# Patient Record
Sex: MALE | Race: BLACK OR AFRICAN AMERICAN | NOT HISPANIC OR LATINO | Employment: UNEMPLOYED | URBAN - METROPOLITAN AREA
[De-identification: names, ages, dates, MRNs, and addresses within clinical notes are randomized per-mention and may not be internally consistent; named-entity substitution may affect disease eponyms.]

---

## 2022-09-26 ENCOUNTER — HOSPITAL ENCOUNTER (EMERGENCY)
Facility: HOSPITAL | Age: 32
Discharge: HOME | End: 2022-09-26
Attending: EMERGENCY MEDICINE

## 2022-09-26 PROCEDURE — 99211 OFF/OP EST MAY X REQ PHY/QHP: CPT | Performed by: EMERGENCY MEDICINE

## 2023-02-08 ENCOUNTER — HOSPITAL ENCOUNTER (EMERGENCY)
Facility: HOSPITAL | Age: 33
Discharge: HOME | End: 2023-02-08
Attending: EMERGENCY MEDICINE | Admitting: EMERGENCY MEDICINE
Payer: MEDICAID

## 2023-02-08 VITALS
SYSTOLIC BLOOD PRESSURE: 126 MMHG | TEMPERATURE: 97 F | DIASTOLIC BLOOD PRESSURE: 68 MMHG | RESPIRATION RATE: 16 BRPM | HEART RATE: 58 BPM | OXYGEN SATURATION: 92 %

## 2023-02-08 DIAGNOSIS — K04.7 DENTAL ABSCESS: Primary | ICD-10-CM

## 2023-02-08 PROCEDURE — 99282 EMERGENCY DEPT VISIT SF MDM: CPT

## 2023-02-08 RX ORDER — AMOXICILLIN 875 MG/1
875 TABLET, COATED ORAL 2 TIMES DAILY
Qty: 14 TABLET | Refills: 0 | Status: SHIPPED | OUTPATIENT
Start: 2023-02-08 | End: 2023-02-15

## 2023-02-08 NOTE — DISCHARGE INSTRUCTIONS
Take the antibiotics as prescribed.  Follow-up with the dentist for further evaluation.  Return here for any trouble breathing or swallowing.  Return here for any fevers.

## 2023-02-08 NOTE — ED PROVIDER NOTES
EMERGENCY DEPARTMENT ENCOUNTER    Room Number:  S01/01  Date of encounter:  2/8/2023  PCP: No primary care provider on file.  No care team member to display   Independent Historians: Patient    HPI:  Chief Complaint: Dental infection  A complete HPI/ROS/PMH/PSH/SH/FH are unobtainable due to: Nothing    Chronic or social conditions impacting patient care (social determinants of health): None    Context: Deny Pimentel is a 32 y.o. male who presents to the ED c/o having a dental infection.  He reports that he recently finished penicillin for a dental infection of his right lower jaw.  He states that he has not yet been able to see a dentist.  He states that he feels the infection is coming back.  He requests antibiotics.  He denies any trouble breathing or swallowing.  He denies any fevers.  He states he has had multiple dental problems in the past.  He states another emergency department prescribed him his penicillin.    Review of prior external notes (non-ED): No prior records in our system.    Review of prior external test results outside of this encounter: No prior results in our system.    PAST MEDICAL HISTORY  Active Ambulatory Problems     Diagnosis Date Noted   • No Active Ambulatory Problems     Resolved Ambulatory Problems     Diagnosis Date Noted   • No Resolved Ambulatory Problems     No Additional Past Medical History       The patient qualifies to receive the vaccine, but they have not yet received it.    PAST SURGICAL HISTORY  No past surgical history on file.      FAMILY HISTORY  No family history on file.      SOCIAL HISTORY         ALLERGIES  Morphine        REVIEW OF SYSTEMS  Review of Systems   No chest pain, no shortness of breath, no nausea, no vomiting, no fever, no chills, no headache, no focal weakness, no focal numbness  All systems reviewed and negative except for those discussed in HPI.       PHYSICAL EXAM    I have reviewed the triage vital signs and nursing notes.    ED Triage Vitals    Temp Heart Rate Resp BP SpO2   02/08/23 1313 02/08/23 1313 02/08/23 1313 02/08/23 1319 02/08/23 1313   97 °F (36.1 °C) 70 16 126/68 92 %      Temp src Heart Rate Source Patient Position BP Location FiO2 (%)   02/08/23 1313 -- 02/08/23 1319 02/08/23 1319 --   Tympanic  Sitting Left arm        Physical Exam  GENERAL: Awake, alert, no acute distress  SKIN: Warm, dry  HENT: Normocephalic, atraumatic.  Mild dental tenderness in the right lower molars.  No gingival swelling or drainable fluid collection.  No stridor or drooling.  No significant cervical lymphadenopathy.  EYES: no scleral icterus  CV: regular rhythm, regular rate  RESPIRATORY: normal effort, lungs clear  ABDOMEN: soft, nontender, nondistended  MUSCULOSKELETAL: no deformity  NEURO: alert, moves all extremities, follows commands          LAB RESULTS  No results found for this or any previous visit (from the past 24 hour(s)).    Ordered the above labs and independently reviewed the results.        RADIOLOGY  No Radiology Exams Resulted Within Past 24 Hours    I ordered the above noted radiological studies. Reviewed by me and discussed with radiologist.  See dictation for official radiology interpretation.      PROCEDURES    Procedures      MEDICATIONS GIVEN IN ER    Medications - No data to display      PROGRESS, DATA ANALYSIS, CONSULTS, AND MEDICAL DECISION MAKING    All labs have been independently reviewed by me.  All radiology studies have been reviewed by me and discussed with radiologist dictating the report.   EKG's independently viewed and interpreted by me.  Discussion below represents my analysis of pertinent findings related to patient's condition, differential diagnosis, treatment plan and final disposition.    Differential diagnosis includes but is not limited to gingivitis, dental infection, periapical abscess, dental caries, deep space neck infection.    ED Course as of 02/08/23 1333   Wed Feb 08, 2023 1332 I see nothing concerning on exam  that would necessitate further diagnostics including imaging or laboratory evaluation.  Plan to put him on amoxicillin and have him follow-up with dentistry.  He indicates he was hopeful that the antibiotics previously would solve the problem and he would not need to see a dentist.  I did advise him that he will need to see a dentist in order to resolve this issue.  Antibiotics will only buy him time.  He request something stronger than penicillin so I will give him amoxicillin. [TR]      ED Course User Index  [TR] Raghavendra Mccoy MD           PPE: The patient wore a mask and I wore an N95 mask throughout the entire patient encounter.       AS OF 13:33 EST VITALS:    BP - 126/68  HR - 58  TEMP - 97 °F (36.1 °C) (Tympanic)  O2 SATS - 92%        DIAGNOSIS  Final diagnoses:   Dental abscess         DISPOSITION  ED Disposition     ED Disposition   Discharge    Condition   Stable    Comment   --                Note Disclaimer: At [facility], we believe that sharing information builds trust and better relationships. You are receiving this note because you recently visited [facility]. It is possible you will see health information before a provider has talked with you about it. This kind of information can be easy to misunderstand. To help you fully understand what it means for your health, we urge you to discuss this note with your provider.       Raghavendra Mccoy MD  02/08/23 [id number]

## 2023-11-24 ENCOUNTER — HOSPITAL ENCOUNTER (OUTPATIENT)
Facility: HOSPITAL | Age: 33
Discharge: HOME | End: 2023-11-24
Attending: EMERGENCY MEDICINE | Admitting: EMERGENCY MEDICINE
Payer: MEDICAID

## 2023-11-24 VITALS
TEMPERATURE: 97.6 F | BODY MASS INDEX: 21.31 KG/M2 | RESPIRATION RATE: 18 BRPM | HEIGHT: 76 IN | OXYGEN SATURATION: 99 % | HEART RATE: 64 BPM | SYSTOLIC BLOOD PRESSURE: 127 MMHG | DIASTOLIC BLOOD PRESSURE: 76 MMHG | WEIGHT: 175 LBS

## 2023-11-24 DIAGNOSIS — K08.89 PAIN, DENTAL: Primary | ICD-10-CM

## 2023-11-24 PROCEDURE — G0463 HOSPITAL OUTPT CLINIC VISIT: HCPCS | Performed by: EMERGENCY MEDICINE

## 2023-11-24 RX ORDER — PENICILLIN V POTASSIUM 500 MG/1
500 TABLET ORAL 4 TIMES DAILY
Qty: 12 TABLET | Refills: 0 | Status: SHIPPED | OUTPATIENT
Start: 2023-11-24 | End: 2023-11-27

## 2023-11-24 RX ORDER — IBUPROFEN 600 MG/1
600 TABLET ORAL ONCE
Status: DISCONTINUED | OUTPATIENT
Start: 2023-11-24 | End: 2023-11-24 | Stop reason: HOSPADM

## 2023-11-24 RX ORDER — LIDOCAINE HYDROCHLORIDE 20 MG/ML
10 SOLUTION OROPHARYNGEAL ONCE
Status: DISCONTINUED | OUTPATIENT
Start: 2023-11-24 | End: 2023-11-24 | Stop reason: HOSPADM

## 2023-11-24 NOTE — FSED PROVIDER NOTE
Subjective   History of Present Illness  33-year-old male here with dental pain that flared up for couple days.  States it has been swollen.  No fevers.  Pain is primarily in the right lower incisors.    History provided by:  Patient      Review of Systems   All other systems reviewed and are negative.      No past medical history on file.    Allergies   Allergen Reactions    Cherry Swelling    Morphine Confusion       No past surgical history on file.    No family history on file.    Social History     Socioeconomic History    Marital status: Single           Objective   Physical Exam  Vitals and nursing note reviewed.   Constitutional:       Appearance: Normal appearance.   HENT:      Mouth/Throat:      Mouth: Mucous membranes are moist.      Dentition: No gingival swelling or dental abscesses.      Tonsils: No tonsillar exudate.   Neurological:      General: No focal deficit present.      Mental Status: He is alert and oriented to person, place, and time.         Procedures           ED Course                                           Medical Decision Making  Problems Addressed:  Pain, dental: complicated acute illness or injury    Risk  OTC drugs.  Prescription drug management.        Final diagnoses:   Pain, dental       ED Disposition  ED Disposition       ED Disposition   Discharge    Condition   Stable    Comment   --               PATIENT CONNECTION - [facility] [address]  [phone]  Call       [place] [address] [facility] [address] [phone]  Go to   If symptoms worsen         Medication List        New Prescriptions      penicillin v potassium 500 MG tablet  Commonly known as: VEETID  Take 1 tablet by mouth 4 (Four) Times a Day for 3 days.               Where to Get Your Medications        These medications were sent to [id number] - [facility] [place] - [address] AT [facility] [address] -  [phone] [facility] [phone] FX  [address], [place] [address]      Phone: [phone]   penicillin v potassium 500 MG tablet